# Patient Record
Sex: MALE | Race: WHITE | NOT HISPANIC OR LATINO | RURAL
[De-identification: names, ages, dates, MRNs, and addresses within clinical notes are randomized per-mention and may not be internally consistent; named-entity substitution may affect disease eponyms.]

---

## 2023-11-21 ENCOUNTER — LAB OUTSIDE AN ENCOUNTER (OUTPATIENT)
Dept: RURAL CLINIC 2 | Facility: CLINIC | Age: 53
End: 2023-11-21

## 2023-11-21 ENCOUNTER — OFFICE VISIT (OUTPATIENT)
Dept: RURAL CLINIC 2 | Facility: CLINIC | Age: 53
End: 2023-11-21
Payer: COMMERCIAL

## 2023-11-21 VITALS
BODY MASS INDEX: 34.67 KG/M2 | WEIGHT: 256 LBS | DIASTOLIC BLOOD PRESSURE: 106 MMHG | SYSTOLIC BLOOD PRESSURE: 158 MMHG | TEMPERATURE: 97.3 F | HEIGHT: 72 IN | HEART RATE: 103 BPM

## 2023-11-21 DIAGNOSIS — Z86.010 HISTORY OF ADENOMATOUS POLYP OF COLON: ICD-10-CM

## 2023-11-21 DIAGNOSIS — K21.9 GASTROESOPHAGEAL REFLUX DISEASE, UNSPECIFIED WHETHER ESOPHAGITIS PRESENT: ICD-10-CM

## 2023-11-21 DIAGNOSIS — R10.84 GENERALIZED POSTPRANDIAL ABDOMINAL PAIN: ICD-10-CM

## 2023-11-21 DIAGNOSIS — R14.0 BLOATING: ICD-10-CM

## 2023-11-21 PROBLEM — 235595009: Status: ACTIVE | Noted: 2023-11-21

## 2023-11-21 PROBLEM — 116289008: Status: ACTIVE | Noted: 2023-11-21

## 2023-11-21 PROBLEM — 102614006: Status: ACTIVE | Noted: 2023-11-21

## 2023-11-21 PROBLEM — 429047008: Status: ACTIVE | Noted: 2023-11-21

## 2023-11-21 PROCEDURE — 99203 OFFICE O/P NEW LOW 30 MIN: CPT | Performed by: INTERNAL MEDICINE

## 2023-11-21 RX ORDER — BUDESONIDE AND FORMOTEROL FUMARATE DIHYDRATE 80; 4.5 UG/1; UG/1
2 PUFFS AEROSOL RESPIRATORY (INHALATION) TWICE A DAY
Status: ACTIVE | COMMUNITY

## 2023-11-21 RX ORDER — B-COMPLEX WITH VITAMIN C
TABLET ORAL
Status: ACTIVE | COMMUNITY

## 2023-11-21 RX ORDER — FENOFIBRATE 48 MG/1
1 TABLET TABLET, FILM COATED ORAL ONCE A DAY
Status: DISCONTINUED | COMMUNITY

## 2023-11-21 RX ORDER — FLUTICASONE PROPIONATE 50 UG/1
1 SPRAY IN EACH NOSTRIL SPRAY, METERED NASAL ONCE A DAY
Status: ACTIVE | COMMUNITY

## 2023-11-21 RX ORDER — FAMOTIDINE 20 MG/1
1 TABLET TABLET ORAL TWICE A DAY
Qty: 180 TABLET | Refills: 5 | Status: ACTIVE | COMMUNITY
Start: 2017-06-07

## 2023-11-21 RX ORDER — ESCITALOPRAM OXALATE 10 MG/1
1 TABLET TABLET, FILM COATED ORAL ONCE A DAY
Status: DISCONTINUED | COMMUNITY

## 2023-11-21 RX ORDER — AMLODIPINE BESYLATE 2.5 MG/1
1 TABLET TABLET ORAL ONCE A DAY
Status: ACTIVE | COMMUNITY

## 2023-11-21 RX ORDER — SODIUM PICOSULFATE, MAGNESIUM OXIDE, AND ANHYDROUS CITRIC ACID 12; 3.5; 1 G/175ML; G/175ML; MG/175ML
175 ML THE FIRST DOSE THE EVENING BEFORE AND SECOND DOSE THE MORNING OF COLONOSCOPY LIQUID ORAL ONCE A DAY
Qty: 350 | Refills: 0 | OUTPATIENT
Start: 2023-11-21 | End: 2023-11-23

## 2023-11-21 NOTE — HPI-ZZZTODAY'S VISIT
1121/2023 The patient is a 53-year-old gentleman, self-referred to undergo a colonoscopy for history of colon polyps.  He is also complaining of of breakthrough acid reflux, upper abdominal pain and bloating.  His previous colonoscopy was completed in 2016 with findings of precancer polyps.  He denies any lower abdominal pain, change in bowels or rectal bleeding.  He reports frequent abdominal bloating, postprandial upper abdominal pain and GERD with intermittent breakthrough acid reflux.  He continues on famotidine 20 mg twice daily.  He says his diet is very poor and typically eats on the go as a .  Past medical history includes hypertension and hyperlipidemia.

## 2023-11-21 NOTE — HPI-MIGRATED HPI
;     Bloating/Gas : Patient presents today for a follow-up on bloating/gas. He did not have the Celiac panel done as ordered at the last visit. Patient states his PCP prescribed Lexapro 10 mg 2 days ago for anxiety. She suspects his GI issues could be the result of stress.  Patient coninues to admit bloating which is almost always present. Denies excessive belching or flatulence. He is taking Famotidine 20 mg BID. He continues to take Peppermint oil OTC which seems to help slightly.   Denies dysphagia, coughing or wheezing. He continues to use Citrucel. Stools continue to vary from formed to loose.  Heartburn has been controlled with Famotidine.;

## 2023-11-22 PROBLEM — 38341003: Status: ACTIVE | Noted: 2023-11-22

## 2023-12-01 ENCOUNTER — TELEPHONE ENCOUNTER (OUTPATIENT)
Dept: RURAL CLINIC 2 | Facility: CLINIC | Age: 53
End: 2023-12-01

## 2024-02-23 ENCOUNTER — COL/EGD (OUTPATIENT)
Dept: RURAL MEDICAL CENTER 4 | Facility: MEDICAL CENTER | Age: 54
End: 2024-02-23

## 2024-03-13 ENCOUNTER — OV EP (OUTPATIENT)
Dept: RURAL CLINIC 2 | Facility: CLINIC | Age: 54
End: 2024-03-13
Payer: COMMERCIAL

## 2024-03-13 VITALS
WEIGHT: 253 LBS | TEMPERATURE: 97.5 F | HEART RATE: 112 BPM | SYSTOLIC BLOOD PRESSURE: 145 MMHG | BODY MASS INDEX: 34.27 KG/M2 | HEIGHT: 72 IN | DIASTOLIC BLOOD PRESSURE: 101 MMHG

## 2024-03-13 DIAGNOSIS — K76.0 FATTY LIVER DISEASE, NONALCOHOLIC: ICD-10-CM

## 2024-03-13 DIAGNOSIS — K21.9 GASTROESOPHAGEAL REFLUX DISEASE, UNSPECIFIED WHETHER ESOPHAGITIS PRESENT: ICD-10-CM

## 2024-03-13 DIAGNOSIS — R10.84 GENERALIZED POSTPRANDIAL ABDOMINAL PAIN: ICD-10-CM

## 2024-03-13 PROBLEM — 1231824009: Status: ACTIVE | Noted: 2024-03-13

## 2024-03-13 PROCEDURE — 99213 OFFICE O/P EST LOW 20 MIN: CPT | Performed by: INTERNAL MEDICINE

## 2024-03-13 RX ORDER — BUDESONIDE AND FORMOTEROL FUMARATE DIHYDRATE 80; 4.5 UG/1; UG/1
2 PUFFS AEROSOL RESPIRATORY (INHALATION) TWICE A DAY
Status: ACTIVE | COMMUNITY

## 2024-03-13 RX ORDER — FAMOTIDINE 20 MG/1
1 TABLET TABLET ORAL TWICE A DAY
Qty: 180 TABLET | Refills: 5 | Status: ACTIVE | COMMUNITY
Start: 2017-06-07

## 2024-03-13 RX ORDER — FLUTICASONE PROPIONATE 50 UG/1
1 SPRAY IN EACH NOSTRIL SPRAY, METERED NASAL ONCE A DAY
Status: ACTIVE | COMMUNITY

## 2024-03-13 RX ORDER — B-COMPLEX WITH VITAMIN C
TABLET ORAL
Status: ACTIVE | COMMUNITY

## 2024-03-13 RX ORDER — AMLODIPINE BESYLATE 2.5 MG/1
1 TABLET TABLET ORAL ONCE A DAY
Status: ACTIVE | COMMUNITY

## 2024-03-13 NOTE — HPI-ZZZTODAY'S VISIT
11/21/2023 The patient is a 53-year-old gentleman, self-referred to undergo a colonoscopy for history of colon polyps.  He is also complaining of of breakthrough acid reflux, upper abdominal pain and bloating.  His previous colonoscopy was completed in 2016 with findings of precancer polyps.  He denies any lower abdominal pain, change in bowels or rectal bleeding.  He reports frequent abdominal bloating, postprandial upper abdominal pain and GERD with intermittent breakthrough acid reflux.  He continues on famotidine 20 mg twice daily.  He says his diet is very poor and typically eats on the go as a .  Past medical history includes hypertension and hyperlipidemia. 03/13/2024 The patient returns for follow-up from bidirectional endoscopy completed for abdominal bloating, chronic GERD, generalized abdominal pain and history of colon polyps with findings of normal upper gastrointestinal endoscopy to the second portion of the duodenum.  Antral and duodenal biopsies obtained with benign findings.  2 small benign hyperplastic polyps were removed from the sigmoid colon and internal hemorrhoids were noted.  The patient denies any further abdominal discomfort however he continues with persistent abdominal bloating with and without food.  Bloating has been occurring for many years and feels it has since worsened.  I did send him for a right upper quadrant abdominal ultrasound which was negative for any acute findings to explain his symptoms.  Hepatic steatosis was noted and likely secondary to obesity/metabolic syndrome.

## 2024-05-29 ENCOUNTER — DASHBOARD ENCOUNTERS (OUTPATIENT)
Age: 54
End: 2024-05-29

## 2024-05-29 ENCOUNTER — OFFICE VISIT (OUTPATIENT)
Dept: RURAL CLINIC 2 | Facility: CLINIC | Age: 54
End: 2024-05-29

## 2024-05-29 ENCOUNTER — LAB OUTSIDE AN ENCOUNTER (OUTPATIENT)
Dept: RURAL CLINIC 2 | Facility: CLINIC | Age: 54
End: 2024-05-29

## 2024-05-29 VITALS
DIASTOLIC BLOOD PRESSURE: 91 MMHG | SYSTOLIC BLOOD PRESSURE: 142 MMHG | WEIGHT: 251 LBS | HEIGHT: 72 IN | BODY MASS INDEX: 34 KG/M2 | TEMPERATURE: 98 F | HEART RATE: 92 BPM

## 2024-05-29 PROBLEM — 446081009: Status: ACTIVE | Noted: 2024-05-29

## 2024-05-29 RX ORDER — BUDESONIDE AND FORMOTEROL FUMARATE DIHYDRATE 80; 4.5 UG/1; UG/1
2 PUFFS AEROSOL RESPIRATORY (INHALATION) TWICE A DAY
Status: DISCONTINUED | COMMUNITY

## 2024-05-29 RX ORDER — B-COMPLEX WITH VITAMIN C
TABLET ORAL
Status: ACTIVE | COMMUNITY

## 2024-05-29 RX ORDER — FAMOTIDINE 20 MG/1
1 TABLET TABLET ORAL TWICE A DAY
Qty: 180 TABLET | Refills: 5 | Status: ACTIVE | COMMUNITY
Start: 2017-06-07

## 2024-05-29 RX ORDER — AMLODIPINE BESYLATE 2.5 MG/1
1 TABLET TABLET ORAL ONCE A DAY
Status: ACTIVE | COMMUNITY

## 2024-05-29 RX ORDER — FLUTICASONE PROPIONATE 50 UG/1
1 SPRAY IN EACH NOSTRIL SPRAY, METERED NASAL ONCE A DAY
Status: ACTIVE | COMMUNITY